# Patient Record
Sex: FEMALE | Race: WHITE | NOT HISPANIC OR LATINO | URBAN - METROPOLITAN AREA
[De-identification: names, ages, dates, MRNs, and addresses within clinical notes are randomized per-mention and may not be internally consistent; named-entity substitution may affect disease eponyms.]

---

## 2022-07-25 ENCOUNTER — HOSPITAL ENCOUNTER (EMERGENCY)
Facility: HOSPITAL | Age: 47
Discharge: HOME | End: 2022-07-25
Attending: SPECIALIST
Payer: MEDICAID

## 2022-07-25 VITALS
RESPIRATION RATE: 18 BRPM | WEIGHT: 201 LBS | DIASTOLIC BLOOD PRESSURE: 76 MMHG | SYSTOLIC BLOOD PRESSURE: 112 MMHG | HEIGHT: 65 IN | TEMPERATURE: 98 F | OXYGEN SATURATION: 99 % | BODY MASS INDEX: 33.49 KG/M2 | HEART RATE: 80 BPM

## 2022-07-25 DIAGNOSIS — M79.605 LEFT LEG PAIN: Primary | ICD-10-CM

## 2022-07-25 PROBLEM — E66.9 OBESITY: Status: ACTIVE | Noted: 2022-07-25

## 2022-07-25 LAB
APPEARANCE UR: CLEAR
B-HCG SERPL QL: NEGATIVE
BACTERIA #/AREA URNS AUTO: ABNORMAL /HPF
BILIRUB UR QL STRIP.AUTO: NEGATIVE MG/DL
COLOR UR AUTO: YELLOW
GLUCOSE UR QL STRIP.AUTO: NEGATIVE MG/DL
KETONES UR QL STRIP.AUTO: NEGATIVE MG/DL
LEUKOCYTE ESTERASE UR QL STRIP.AUTO: NEGATIVE UNIT/L
NITRITE UR QL STRIP.AUTO: NEGATIVE
PH UR STRIP.AUTO: 7 [PH]
PROT UR QL STRIP.AUTO: NEGATIVE MG/DL
RBC #/AREA URNS AUTO: ABNORMAL /HPF
RBC UR QL AUTO: ABNORMAL UNIT/L
SP GR UR STRIP.AUTO: 1.02
SQUAMOUS #/AREA URNS AUTO: ABNORMAL /HPF
UROBILINOGEN UR STRIP-ACNC: 1 MG/DL
WBC #/AREA URNS AUTO: ABNORMAL /HPF

## 2022-07-25 PROCEDURE — 99284 EMERGENCY DEPT VISIT MOD MDM: CPT | Mod: 25

## 2022-07-25 PROCEDURE — 96372 THER/PROPH/DIAG INJ SC/IM: CPT | Performed by: NURSE PRACTITIONER

## 2022-07-25 PROCEDURE — 63600175 PHARM REV CODE 636 W HCPCS: Performed by: NURSE PRACTITIONER

## 2022-07-25 PROCEDURE — 81001 URINALYSIS AUTO W/SCOPE: CPT | Performed by: NURSE PRACTITIONER

## 2022-07-25 PROCEDURE — 81025 URINE PREGNANCY TEST: CPT | Performed by: NURSE PRACTITIONER

## 2022-07-25 RX ORDER — MEDROXYPROGESTERONE ACETATE 150 MG/ML
150 INJECTION, SUSPENSION INTRAMUSCULAR
COMMUNITY
Start: 2022-06-22

## 2022-07-25 RX ORDER — DEXAMETHASONE SODIUM PHOSPHATE 4 MG/ML
8 INJECTION, SOLUTION INTRA-ARTICULAR; INTRALESIONAL; INTRAMUSCULAR; INTRAVENOUS; SOFT TISSUE
Status: COMPLETED | OUTPATIENT
Start: 2022-07-25 | End: 2022-07-25

## 2022-07-25 RX ORDER — TIZANIDINE 4 MG/1
4 TABLET ORAL 3 TIMES DAILY PRN
Qty: 15 TABLET | Refills: 0 | Status: SHIPPED | OUTPATIENT
Start: 2022-07-25 | End: 2022-07-30

## 2022-07-25 RX ORDER — KETOROLAC TROMETHAMINE 30 MG/ML
30 INJECTION, SOLUTION INTRAMUSCULAR; INTRAVENOUS
Status: COMPLETED | OUTPATIENT
Start: 2022-07-25 | End: 2022-07-25

## 2022-07-25 RX ORDER — DICLOFENAC SODIUM 25 MG/1
25 TABLET, DELAYED RELEASE ORAL 3 TIMES DAILY PRN
Qty: 15 TABLET | Refills: 0 | Status: SHIPPED | OUTPATIENT
Start: 2022-07-25 | End: 2022-08-01

## 2022-07-25 RX ADMIN — KETOROLAC TROMETHAMINE 30 MG: 30 INJECTION, SOLUTION INTRAMUSCULAR; INTRAVENOUS at 08:07

## 2022-07-25 RX ADMIN — DEXAMETHASONE SODIUM PHOSPHATE 8 MG: 4 INJECTION INTRA-ARTICULAR; INTRALESIONAL; INTRAMUSCULAR; INTRAVENOUS; SOFT TISSUE at 08:07

## 2022-07-25 NOTE — Clinical Note
"Sugar Weldon" Beverley was seen and treated in our emergency department on 7/25/2022.  She may return to work on 07/28/2022.       If you have any questions or concerns, please don't hesitate to call.      LAMAR Granados"

## 2022-07-26 NOTE — DISCHARGE INSTRUCTIONS
Avoid excessive bending, stooping or stair climbing  Diclofenac as needed for pain, take with food  Tizanidine as needed for muscle pain, do not take and drive

## 2022-07-26 NOTE — ED PROVIDER NOTES
Encounter Date: 7/25/2022       History     Chief Complaint   Patient presents with    Leg Pain     Complains of groin pain radiating into left leg onset friday     46 year old female presents with left inguinal pain that radiates into left thigh since Friday with no known injury. She denies fever, dysuria, NVDC. She states pain consistent whether she is standing, sitting or lying. She does reports some improvement with getting up and moving around. She reports history of fibroids. She is on Dep Provera.     The history is provided by the patient. No  was used.     Review of patient's allergies indicates:  No Known Allergies  No past medical history on file.  No past surgical history on file.  No family history on file.     Review of Systems   Constitutional: Negative for chills and fever.   Gastrointestinal: Negative for diarrhea, nausea and vomiting.   Genitourinary: Negative for dysuria and vaginal discharge.   Musculoskeletal: Positive for arthralgias.   Skin: Negative for rash and wound.   Neurological: Negative for numbness.       Physical Exam     Initial Vitals [07/25/22 1942]   BP Pulse Resp Temp SpO2   119/79 86 16 98.1 °F (36.7 °C) 98 %      MAP       --         Physical Exam    Constitutional: She appears well-developed and well-nourished.   HENT:   Head: Normocephalic.   Eyes: EOM are normal.   Cardiovascular: Normal rate.   Pulmonary/Chest: No respiratory distress.   Abdominal: There is no abdominal tenderness. Hernia confirmed negative in the left inguinal area.   Musculoskeletal:        Legs:       Comments: No erythema, rash, lymphadenopathy. TTP. Normal ROM     Lymphadenopathy: No inguinal adenopathy noted on the left side.   Skin: Skin is warm, dry and intact. No lesion and no abscess noted. No erythema.         ED Course   Procedures  Labs Reviewed   URINALYSIS, REFLEX TO URINE CULTURE - Abnormal; Notable for the following components:       Result Value    Blood, UA  "Trace-Intact (*)     All other components within normal limits   URINALYSIS, MICROSCOPIC - Abnormal; Notable for the following components:    Squamous Epithelial Cells, UA Few (*)     All other components within normal limits   PREGNANCY TEST, URINE RAPID - Normal          Imaging Results    None          Medications   dexamethasone injection 8 mg (has no administration in time range)   ketorolac injection 30 mg (has no administration in time range)     Medical Decision Making:   Differential Diagnosis:   Pelvic pain, inguinal hernia, radiculopathy  Clinical Tests:   Lab Tests: Ordered and Reviewed  The following lab test(s) were unremarkable: UPT and Urinalysis       <> Summary of Lab: UPT negative, urine without infection  ED Management:  Patient with negative urine and UPT. She denies injury, fever, NVDC. Pain improves with "moving around". She has history of fibroids. Patient given decadron and ketorolac in ER. Will discharge home with diclofenac and zanaflex. Work excuse for 2 days.                       Clinical Impression:   Final diagnoses:  [M79.605] Left leg pain (Primary)          ED Disposition Condition    Discharge Stable        ED Prescriptions     Medication Sig Dispense Start Date End Date Auth. Provider    diclofenac (VOLTAREN) 25 MG TbEC Take 1 tablet (25 mg total) by mouth 3 (three) times daily as needed (pain). 15 tablet 7/25/2022 8/1/2022 LAMAR Granados    tiZANidine (ZANAFLEX) 4 MG tablet Take 1 tablet (4 mg total) by mouth 3 (three) times daily as needed (muscle spasms). 15 tablet 7/25/2022 7/30/2022 LAMAR Granados        Follow-up Information    None          LAMAR Granados  07/25/22 2013    "

## 2022-09-18 ENCOUNTER — HOSPITAL ENCOUNTER (EMERGENCY)
Facility: HOSPITAL | Age: 47
Discharge: LEFT BEFORE DISCHARGE | End: 2022-09-18
Payer: MEDICAID

## 2022-09-18 ENCOUNTER — HOSPITAL ENCOUNTER (EMERGENCY)
Facility: HOSPITAL | Age: 47
Discharge: HOME | End: 2022-09-18
Attending: FAMILY MEDICINE
Payer: MEDICAID

## 2022-09-18 VITALS
RESPIRATION RATE: 18 BRPM | SYSTOLIC BLOOD PRESSURE: 122 MMHG | DIASTOLIC BLOOD PRESSURE: 78 MMHG | HEART RATE: 91 BPM | HEIGHT: 65 IN | BODY MASS INDEX: 32.65 KG/M2 | OXYGEN SATURATION: 99 % | WEIGHT: 196 LBS | TEMPERATURE: 98 F

## 2022-09-18 VITALS
DIASTOLIC BLOOD PRESSURE: 77 MMHG | WEIGHT: 192 LBS | SYSTOLIC BLOOD PRESSURE: 109 MMHG | RESPIRATION RATE: 22 BRPM | BODY MASS INDEX: 31.99 KG/M2 | HEART RATE: 86 BPM | HEIGHT: 65 IN | TEMPERATURE: 98 F | OXYGEN SATURATION: 100 %

## 2022-09-18 DIAGNOSIS — D21.9 FIBROIDS: Primary | ICD-10-CM

## 2022-09-18 LAB
ALBUMIN SERPL-MCNC: 3.7 GM/DL (ref 3.5–5)
ALBUMIN/GLOB SERPL: 1 RATIO (ref 1.1–2)
ALP SERPL-CCNC: 74 UNIT/L (ref 40–150)
ALT SERPL-CCNC: 9 UNIT/L (ref 0–55)
APPEARANCE UR: ABNORMAL
APPEARANCE UR: CLEAR
AST SERPL-CCNC: 9 UNIT/L (ref 5–34)
B-HCG SERPL QL: NEGATIVE
BACTERIA #/AREA URNS AUTO: ABNORMAL /HPF
BACTERIA #/AREA URNS AUTO: ABNORMAL /HPF
BASOPHILS # BLD AUTO: 0.01 X10(3)/MCL (ref 0–0.2)
BASOPHILS # BLD AUTO: 0.04 X10(3)/MCL (ref 0–0.2)
BASOPHILS NFR BLD AUTO: 0.1 %
BASOPHILS NFR BLD AUTO: 0.5 %
BILIRUB UR QL STRIP.AUTO: ABNORMAL MG/DL
BILIRUB UR QL STRIP.AUTO: NEGATIVE MG/DL
BILIRUBIN DIRECT+TOT PNL SERPL-MCNC: 0.7 MG/DL
BUN SERPL-MCNC: 9.6 MG/DL (ref 7–18.7)
CALCIUM SERPL-MCNC: 9.6 MG/DL (ref 8.4–10.2)
CHLORIDE SERPL-SCNC: 105 MMOL/L (ref 98–107)
CO2 SERPL-SCNC: 23 MMOL/L (ref 22–29)
COLOR UR AUTO: ABNORMAL
COLOR UR AUTO: YELLOW
CREAT SERPL-MCNC: 0.76 MG/DL (ref 0.55–1.02)
EOSINOPHIL # BLD AUTO: 0.13 X10(3)/MCL (ref 0–0.9)
EOSINOPHIL # BLD AUTO: 0.17 X10(3)/MCL (ref 0–0.9)
EOSINOPHIL NFR BLD AUTO: 1.2 %
EOSINOPHIL NFR BLD AUTO: 1.9 %
ERYTHROCYTE [DISTWIDTH] IN BLOOD BY AUTOMATED COUNT: 14.5 % (ref 11.5–17)
ERYTHROCYTE [DISTWIDTH] IN BLOOD BY AUTOMATED COUNT: 14.9 % (ref 11.5–17)
GFR SERPLBLD CREATININE-BSD FMLA CKD-EPI: >60 MLS/MIN/1.73/M2
GLOBULIN SER-MCNC: 3.8 GM/DL (ref 2.4–3.5)
GLUCOSE SERPL-MCNC: 114 MG/DL (ref 74–100)
GLUCOSE UR QL STRIP.AUTO: NEGATIVE MG/DL
GLUCOSE UR QL STRIP.AUTO: NEGATIVE MG/DL
HCT VFR BLD AUTO: 42 % (ref 37–47)
HCT VFR BLD AUTO: 45.4 % (ref 37–47)
HGB BLD-MCNC: 13 GM/DL (ref 12–16)
HGB BLD-MCNC: 14.1 GM/DL (ref 12–16)
IMM GRANULOCYTES # BLD AUTO: 0.02 X10(3)/MCL (ref 0–0.04)
IMM GRANULOCYTES # BLD AUTO: 0.02 X10(3)/MCL (ref 0–0.04)
IMM GRANULOCYTES NFR BLD AUTO: 0.2 %
IMM GRANULOCYTES NFR BLD AUTO: 0.2 %
KETONES UR QL STRIP.AUTO: NEGATIVE MG/DL
KETONES UR QL STRIP.AUTO: NEGATIVE MG/DL
LEUKOCYTE ESTERASE UR QL STRIP.AUTO: ABNORMAL UNIT/L
LEUKOCYTE ESTERASE UR QL STRIP.AUTO: NEGATIVE UNIT/L
LIPASE SERPL-CCNC: 26 U/L
LYMPHOCYTES # BLD AUTO: 3.55 X10(3)/MCL (ref 0.6–4.6)
LYMPHOCYTES # BLD AUTO: 3.56 X10(3)/MCL (ref 0.6–4.6)
LYMPHOCYTES NFR BLD AUTO: 34 %
LYMPHOCYTES NFR BLD AUTO: 40.5 %
MCH RBC QN AUTO: 24.5 PG (ref 27–31)
MCH RBC QN AUTO: 25.2 PG (ref 27–31)
MCHC RBC AUTO-ENTMCNC: 31 MG/DL (ref 33–36)
MCHC RBC AUTO-ENTMCNC: 31.1 MG/DL (ref 33–36)
MCV RBC AUTO: 79.1 FL (ref 80–94)
MCV RBC AUTO: 81.1 FL (ref 80–94)
MONOCYTES # BLD AUTO: 0.73 X10(3)/MCL (ref 0.1–1.3)
MONOCYTES # BLD AUTO: 0.77 X10(3)/MCL (ref 0.1–1.3)
MONOCYTES NFR BLD AUTO: 7 %
MONOCYTES NFR BLD AUTO: 8.8 %
NEUTROPHILS # BLD AUTO: 4.2 X10(3)/MCL (ref 2.1–9.2)
NEUTROPHILS # BLD AUTO: 6 X10(3)/MCL (ref 2.1–9.2)
NEUTROPHILS NFR BLD AUTO: 48.1 %
NEUTROPHILS NFR BLD AUTO: 57.5 %
NITRITE UR QL STRIP.AUTO: NEGATIVE
NITRITE UR QL STRIP.AUTO: NEGATIVE
NRBC BLD AUTO-RTO: 0 %
PH UR STRIP.AUTO: 5.5 [PH]
PH UR STRIP.AUTO: 5.5 [PH]
PLATELET # BLD AUTO: 252 X10(3)/MCL (ref 130–400)
PLATELET # BLD AUTO: 253 X10(3)/MCL (ref 130–400)
PMV BLD AUTO: 11.4 FL (ref 7.4–10.4)
PMV BLD AUTO: 11.4 FL (ref 7.4–10.4)
POTASSIUM SERPL-SCNC: 4 MMOL/L (ref 3.5–5.1)
PROT SERPL-MCNC: 7.5 GM/DL (ref 6.4–8.3)
PROT UR QL STRIP.AUTO: ABNORMAL MG/DL
PROT UR QL STRIP.AUTO: NEGATIVE MG/DL
RBC # BLD AUTO: 5.31 X10(6)/MCL (ref 4.2–5.4)
RBC # BLD AUTO: 5.6 X10(6)/MCL (ref 4.2–5.4)
RBC #/AREA URNS AUTO: ABNORMAL /HPF
RBC #/AREA URNS AUTO: ABNORMAL /HPF
RBC UR QL AUTO: ABNORMAL UNIT/L
RBC UR QL AUTO: ABNORMAL UNIT/L
SODIUM SERPL-SCNC: 139 MMOL/L (ref 136–145)
SP GR UR STRIP.AUTO: 1.02
SP GR UR STRIP.AUTO: 1.02 (ref 1–1.03)
SQUAMOUS #/AREA URNS AUTO: ABNORMAL /HPF
SQUAMOUS #/AREA URNS AUTO: ABNORMAL /HPF
UROBILINOGEN UR STRIP-ACNC: 0.2 MG/DL
UROBILINOGEN UR STRIP-ACNC: 1 MG/DL
WBC # SPEC AUTO: 10.5 X10(3)/MCL (ref 4.5–11.5)
WBC # SPEC AUTO: 8.8 X10(3)/MCL (ref 4.5–11.5)
WBC #/AREA URNS AUTO: ABNORMAL /HPF
WBC #/AREA URNS AUTO: ABNORMAL /HPF

## 2022-09-18 PROCEDURE — 25000003 PHARM REV CODE 250: Performed by: FAMILY MEDICINE

## 2022-09-18 PROCEDURE — 81025 URINE PREGNANCY TEST: CPT | Performed by: FAMILY MEDICINE

## 2022-09-18 PROCEDURE — 96361 HYDRATE IV INFUSION ADD-ON: CPT

## 2022-09-18 PROCEDURE — 96375 TX/PRO/DX INJ NEW DRUG ADDON: CPT

## 2022-09-18 PROCEDURE — 81001 URINALYSIS AUTO W/SCOPE: CPT | Performed by: NURSE PRACTITIONER

## 2022-09-18 PROCEDURE — 85025 COMPLETE CBC W/AUTO DIFF WBC: CPT | Performed by: FAMILY MEDICINE

## 2022-09-18 PROCEDURE — 25000003 PHARM REV CODE 250: Performed by: NURSE PRACTITIONER

## 2022-09-18 PROCEDURE — 99285 EMERGENCY DEPT VISIT HI MDM: CPT | Mod: 25

## 2022-09-18 PROCEDURE — 63600175 PHARM REV CODE 636 W HCPCS: Performed by: FAMILY MEDICINE

## 2022-09-18 PROCEDURE — 96374 THER/PROPH/DIAG INJ IV PUSH: CPT | Mod: 59

## 2022-09-18 PROCEDURE — 81001 URINALYSIS AUTO W/SCOPE: CPT | Performed by: FAMILY MEDICINE

## 2022-09-18 PROCEDURE — 80053 COMPREHEN METABOLIC PANEL: CPT | Performed by: FAMILY MEDICINE

## 2022-09-18 PROCEDURE — 83690 ASSAY OF LIPASE: CPT | Performed by: FAMILY MEDICINE

## 2022-09-18 PROCEDURE — 36415 COLL VENOUS BLD VENIPUNCTURE: CPT | Performed by: NURSE PRACTITIONER

## 2022-09-18 PROCEDURE — 36415 COLL VENOUS BLD VENIPUNCTURE: CPT | Performed by: FAMILY MEDICINE

## 2022-09-18 PROCEDURE — 85025 COMPLETE CBC W/AUTO DIFF WBC: CPT | Performed by: NURSE PRACTITIONER

## 2022-09-18 PROCEDURE — 99900041 HC LEFT WITHOUT BEING SEEN- EMERGENCY

## 2022-09-18 PROCEDURE — 25500020 PHARM REV CODE 255: Performed by: FAMILY MEDICINE

## 2022-09-18 RX ORDER — KETOROLAC TROMETHAMINE 30 MG/ML
15 INJECTION, SOLUTION INTRAMUSCULAR; INTRAVENOUS
Status: COMPLETED | OUTPATIENT
Start: 2022-09-18 | End: 2022-09-18

## 2022-09-18 RX ORDER — DICLOFENAC SODIUM 50 MG/1
50 TABLET, DELAYED RELEASE ORAL 3 TIMES DAILY
Qty: 9 TABLET | Refills: 0 | Status: SHIPPED | OUTPATIENT
Start: 2022-09-18 | End: 2022-09-21

## 2022-09-18 RX ORDER — KETOROLAC TROMETHAMINE 10 MG/1
10 TABLET, FILM COATED ORAL
Status: COMPLETED | OUTPATIENT
Start: 2022-09-18 | End: 2022-09-18

## 2022-09-18 RX ADMIN — SODIUM CHLORIDE 500 ML: 9 INJECTION, SOLUTION INTRAVENOUS at 07:09

## 2022-09-18 RX ADMIN — KETOROLAC TROMETHAMINE 15 MG: 30 INJECTION, SOLUTION INTRAMUSCULAR at 09:09

## 2022-09-18 RX ADMIN — KETOROLAC TROMETHAMINE 15 MG: 30 INJECTION, SOLUTION INTRAMUSCULAR; INTRAVENOUS at 07:09

## 2022-09-18 RX ADMIN — KETOROLAC TROMETHAMINE 10 MG: 10 TABLET, FILM COATED ORAL at 12:09

## 2022-09-18 RX ADMIN — IOPAMIDOL 100 ML: 755 INJECTION, SOLUTION INTRAVENOUS at 09:09

## 2022-09-18 NOTE — FIRST PROVIDER EVALUATION
"Medical screening examination initiated.  I have conducted a focused provider triage encounter, findings are as follows:    Brief history of present illness:  46y/o F presents to the ED with lower abdominal pain describes as " contractions" Onset 2 days.  Denies any n/v    There were no vitals filed for this visit.    Pertinent physical exam:  AAA x 3    Brief workup plan:  Labs,     Preliminary workup initiated; this workup will be continued and followed by the physician or advanced practice provider that is assigned to the patient when roomed.  "

## 2022-09-18 NOTE — Clinical Note
"Sugar Weldon" Beverley was seen and treated in our emergency department on 9/18/2022.  She may return to work on 09/21/2022.       If you have any questions or concerns, please don't hesitate to call.      th RN    "

## 2022-09-19 NOTE — ED PROVIDER NOTES
Encounter Date: 9/18/2022       History     Chief Complaint   Patient presents with    Abdominal Pain     Reports bilat lower abd pain/lower back pain x2 days. Hx of fibroids. Denies fever, chills, dysuria, vaginal bleeding, or n/v/d. On depo shot.      Patient with bilateral lower abdominal pain was seen at [facility] but did not wait for the results and left otherwise patient does have a history of fibroids no dysuria no vaginal bleeding patient does get the Depo shot.      Review of patient's allergies indicates:  No Known Allergies  No past medical history on file.  No past surgical history on file.  No family history on file.     Review of Systems   Constitutional:  Negative for fever.   HENT:  Negative for sore throat.    Respiratory:  Negative for shortness of breath.    Cardiovascular:  Negative for chest pain.   Gastrointestinal:  Positive for abdominal pain. Negative for nausea.   Genitourinary:  Negative for dysuria.   Musculoskeletal:  Negative for back pain.   Skin:  Negative for rash.   Neurological:  Negative for weakness.   Hematological:  Does not bruise/bleed easily.   All other systems reviewed and are negative.    Physical Exam     Initial Vitals [09/18/22 1924]   BP Pulse Resp Temp SpO2   (!) 169/85 108 20 98.2 °F (36.8 °C) 100 %      MAP       --         Physical Exam    Nursing note and vitals reviewed.  Constitutional: She appears well-developed.   HENT:   Head: Normocephalic and atraumatic.   Right Ear: External ear normal.   Left Ear: External ear normal.   Nose: Nose normal.   Mouth/Throat: Oropharynx is clear and moist. No oropharyngeal exudate.   Eyes: Conjunctivae and EOM are normal. Pupils are equal, round, and reactive to light. Right eye exhibits no discharge. Left eye exhibits no discharge.   Neck: Neck supple. No tracheal deviation present. No JVD present.   Normal range of motion.  Cardiovascular:  Normal rate, regular rhythm, normal heart sounds and intact distal pulses.      Exam reveals no gallop and no friction rub.       No murmur heard.  Pulmonary/Chest: Breath sounds normal. No stridor. No respiratory distress. She has no wheezes. She has no rhonchi. She has no rales.   Abdominal: Abdomen is soft. Bowel sounds are normal. She exhibits no distension and no mass. There is abdominal tenderness. There is no rebound and no guarding.   Musculoskeletal:         General: Normal range of motion.      Cervical back: Normal range of motion and neck supple.     Neurological: She is alert and oriented to person, place, and time. She has normal strength. No cranial nerve deficit.   Skin: Skin is warm and dry. No rash and no abscess noted. No erythema.   Psychiatric: She has a normal mood and affect. Her behavior is normal. Judgment and thought content normal.       ED Course   Procedures  Labs Reviewed   COMPREHENSIVE METABOLIC PANEL - Abnormal; Notable for the following components:       Result Value    Glucose Level 114 (*)     Globulin 3.8 (*)     Albumin/Globulin Ratio 1.0 (*)     All other components within normal limits   URINALYSIS, REFLEX TO URINE CULTURE - Abnormal; Notable for the following components:    Color, UA Dark Yellow (*)     Appearance, UA Slightly Cloudy (*)     Protein, UA Trace (*)     Blood, UA Moderate (*)     Bilirubin, UA Small (*)     All other components within normal limits   CBC WITH DIFFERENTIAL - Abnormal; Notable for the following components:    MCV 79.1 (*)     MCH 24.5 (*)     MCHC 31.0 (*)     MPV 11.4 (*)     All other components within normal limits   URINALYSIS, MICROSCOPIC - Abnormal; Notable for the following components:    RBC, UA 11-20 (*)     Squamous Epithelial Cells, UA Few (*)     All other components within normal limits   LIPASE - Normal   HCG QUALITATIVE URINE - Normal   CBC W/ AUTO DIFFERENTIAL    Narrative:     The following orders were created for panel order CBC W/ AUTO DIFFERENTIAL.  Procedure                               Abnormality          Status                     ---------                               -----------         ------                     CBC with Differential[[id number]]        Abnormal            Final result                 Please view results for these tests on the individual orders.          Imaging Results              CT Abdomen Pelvis With Contrast (Preliminary result)  Result time 09/18/22 21:51:03      Preliminary result by Celestine Rascon MD (09/18/22 21:51:03)                   Narrative:    START OF REPORT:  Technique: CT of the abdomen and pelvis was performed with axial images as well as sagittal and coronal reconstruction images with intravenous contrast but without oral contrast.    Comparison: None available.    Clinical History: Reports bilat lower abd pain/lower back pain x2 days. Hx of fibroids. Denies fever, chills, dysuria, vaginal bleeding, or n/v/d. On depo shot.    Dosage Information: Automated Exposure Control was utilized 1213.24 mGy.cm.    Findings:  Lines and Tubes: None.  Thorax:  Lungs: The visualized lung bases appear unremarkable.  Pleura: No pleural effusion is seen.  Heart: The heart size is within normal limits.  Abdomen:  Abdominal Wall: No abdominal wall pathology is seen.  Liver: A 5 mm cyst is seen in the left lobe of the liver (series 3 image 16). The liver otherwise appears unremarkable.  Biliary System: No extrahepatic biliary duct dilatation is seen.  Gallbladder: The gallbladder is non-distended and appears otherwise unremarkable.  Pancreas: The pancreas appears unremarkable.  Spleen: The spleen appears unremarkable.  Adrenals: The adrenal glands appear unremarkable.  Kidneys: The kidneys appear unremarkable with no stones cysts masses or hydronephrosis.  Aorta: There is mild calcification of the abdominal aorta and its branches.  IVC: Unremarkable.  Bowel:  Esophagus: The visualized esophagus appears unremarkable.  Stomach: The stomach appears unremarkable.  Duodenum: Unremarkable  appearing duodenum.  Small Bowel: The small bowel appears unremarkable.  Colon: Nondistended.  Appendix: The appendix appears unremarkable.  Peritoneum: Trace free fluid is seen in the pelvis. No free intraperitoneal gas is seen.    Pelvis:  Bladder: The bladder is nondistended and cannot be definitively evaluated.  Female:  Uterus: The uterus is enlarged and demonstrates lobulated contour with multiple fibroids. The largest fibroid measures 7.2 cm in diameter.  Ovaries: The ovaries are not identified. No adnexal masses are seen.    Bony structures:  Dorsal Spine: Degenerative changes are seen at L5-S1.  Bony Pelvis: The visualized bony structures of the pelvis appear unremarkable.      Impression:  1. Trace free fluid is seen in the pelvis.  2. The uterus is enlarged and demonstrates lobulated contour with multiple fibroids. The largest fibroid measures 7.2 cm in diameter. Correlate clinically as regards to additional evaluation and follow up.  3. No acute intraabdominal or pelvic solid organ or bowel pathology identified. Details and other findings as discussed above.                          Preliminary result by [employer], Rad Results In (09/18/22 21:51:03)                   Narrative:    START OF REPORT:  Technique: CT of the abdomen and pelvis was performed with axial images as well as sagittal and coronal reconstruction images with intravenous contrast but without oral contrast.    Comparison: None available.    Clinical History: Reports bilat lower abd pain/lower back pain x2 days. Hx of fibroids. Denies fever, chills, dysuria, vaginal bleeding, or n/v/d. On depo shot.    Dosage Information: Automated Exposure Control was utilized 1213.24 mGy.cm.    Findings:  Lines and Tubes: None.  Thorax:  Lungs: The visualized lung bases appear unremarkable.  Pleura: No pleural effusion is seen.  Heart: The heart size is within normal limits.  Abdomen:  Abdominal Wall: No abdominal wall pathology is seen.  Liver: A 5 mm  cyst is seen in the left lobe of the liver (series 3 image 16). The liver otherwise appears unremarkable.  Biliary System: No extrahepatic biliary duct dilatation is seen.  Gallbladder: The gallbladder is non-distended and appears otherwise unremarkable.  Pancreas: The pancreas appears unremarkable.  Spleen: The spleen appears unremarkable.  Adrenals: The adrenal glands appear unremarkable.  Kidneys: The kidneys appear unremarkable with no stones cysts masses or hydronephrosis.  Aorta: There is mild calcification of the abdominal aorta and its branches.  IVC: Unremarkable.  Bowel:  Esophagus: The visualized esophagus appears unremarkable.  Stomach: The stomach appears unremarkable.  Duodenum: Unremarkable appearing duodenum.  Small Bowel: The small bowel appears unremarkable.  Colon: Nondistended.  Appendix: The appendix appears unremarkable.  Peritoneum: Trace free fluid is seen in the pelvis. No free intraperitoneal gas is seen.    Pelvis:  Bladder: The bladder is nondistended and cannot be definitively evaluated.  Female:  Uterus: The uterus is enlarged and demonstrates lobulated contour with multiple fibroids. The largest fibroid measures 7.2 cm in diameter.  Ovaries: The ovaries are not identified. No adnexal masses are seen.    Bony structures:  Dorsal Spine: Degenerative changes are seen at L5-S1.  Bony Pelvis: The visualized bony structures of the pelvis appear unremarkable.      Impression:  1. Trace free fluid is seen in the pelvis.  2. The uterus is enlarged and demonstrates lobulated contour with multiple fibroids. The largest fibroid measures 7.2 cm in diameter. Correlate clinically as regards to additional evaluation and follow up.  3. No acute intraabdominal or pelvic solid organ or bowel pathology identified. Details and other findings as discussed above.                                         Medications   sodium chloride 0.9% bolus 500 mL (0 mLs Intravenous Stopped 9/18/22 2053)   ketorolac  injection 15 mg (15 mg Intravenous Given 9/18/22 1952)   ketorolac injection 15 mg (15 mg Intravenous Given 9/18/22 2121)   iopamidoL (ISOVUE-370) injection 100 mL (100 mLs Intravenous Given 9/18/22 2151)                              Clinical Impression:   Final diagnoses:  [D21.9] Fibroids (Primary)      ED Disposition Condition    Discharge Stable          ED Prescriptions       Medication Sig Dispense Start Date End Date Auth. Provider    diclofenac (VOLTAREN) 50 MG EC tablet Take 1 tablet (50 mg total) by mouth 3 (three) times daily. for 3 days 9 tablet 9/18/2022 9/21/2022 Jonathon Tabor MD          Follow-up Information    None          Jonathon Tabor MD  09/18/22 224       Jonathon Tabor MD  09/18/22 9744

## 2024-02-17 ENCOUNTER — HOSPITAL ENCOUNTER (EMERGENCY)
Facility: HOSPITAL | Age: 49
Discharge: HOME | End: 2024-02-17
Attending: EMERGENCY MEDICINE

## 2024-02-17 VITALS
RESPIRATION RATE: 13 BRPM | WEIGHT: 195 LBS | BODY MASS INDEX: 31.34 KG/M2 | TEMPERATURE: 98 F | HEART RATE: 67 BPM | HEIGHT: 66 IN | OXYGEN SATURATION: 100 % | DIASTOLIC BLOOD PRESSURE: 74 MMHG | SYSTOLIC BLOOD PRESSURE: 120 MMHG

## 2024-02-17 DIAGNOSIS — R10.2 FEMALE PELVIC PAIN: Primary | ICD-10-CM

## 2024-02-17 DIAGNOSIS — U07.1 COVID-19: ICD-10-CM

## 2024-02-17 DIAGNOSIS — R42 LIGHTHEADEDNESS: ICD-10-CM

## 2024-02-17 LAB
ALBUMIN SERPL-MCNC: 3.9 G/DL (ref 3.5–5)
ALBUMIN/GLOB SERPL: 1 RATIO (ref 1.1–2)
ALP SERPL-CCNC: 63 UNIT/L (ref 40–150)
ALT SERPL-CCNC: 13 UNIT/L (ref 0–55)
APPEARANCE UR: ABNORMAL
AST SERPL-CCNC: 18 UNIT/L (ref 5–34)
B-HCG SERPL QL: NEGATIVE
BACTERIA #/AREA URNS AUTO: ABNORMAL /HPF
BASOPHILS # BLD AUTO: 0.02 X10(3)/MCL
BASOPHILS NFR BLD AUTO: 0.5 %
BILIRUB SERPL-MCNC: 0.4 MG/DL
BILIRUB UR QL STRIP.AUTO: NEGATIVE
BUN SERPL-MCNC: 7.9 MG/DL (ref 7–18.7)
CALCIUM SERPL-MCNC: 8.9 MG/DL (ref 8.4–10.2)
CHLORIDE SERPL-SCNC: 109 MMOL/L (ref 98–107)
CO2 SERPL-SCNC: 23 MMOL/L (ref 22–29)
COLOR UR AUTO: YELLOW
CREAT SERPL-MCNC: 0.83 MG/DL (ref 0.55–1.02)
EOSINOPHIL # BLD AUTO: 0.04 X10(3)/MCL (ref 0–0.9)
EOSINOPHIL NFR BLD AUTO: 1 %
ERYTHROCYTE [DISTWIDTH] IN BLOOD BY AUTOMATED COUNT: 14 % (ref 11.5–17)
FLUAV AG UPPER RESP QL IA.RAPID: NOT DETECTED
FLUBV AG UPPER RESP QL IA.RAPID: NOT DETECTED
GFR SERPLBLD CREATININE-BSD FMLA CKD-EPI: >60 MLS/MIN/1.73/M2
GLOBULIN SER-MCNC: 3.8 GM/DL (ref 2.4–3.5)
GLUCOSE SERPL-MCNC: 89 MG/DL (ref 74–100)
GLUCOSE UR QL STRIP.AUTO: NORMAL
HCT VFR BLD AUTO: 43.4 % (ref 37–47)
HGB BLD-MCNC: 13.7 G/DL (ref 12–16)
HYALINE CASTS #/AREA URNS LPF: ABNORMAL /LPF
IMM GRANULOCYTES # BLD AUTO: 0.01 X10(3)/MCL (ref 0–0.04)
IMM GRANULOCYTES NFR BLD AUTO: 0.2 %
KETONES UR QL STRIP.AUTO: NEGATIVE
LEUKOCYTE ESTERASE UR QL STRIP.AUTO: NEGATIVE
LYMPHOCYTES # BLD AUTO: 2.15 X10(3)/MCL (ref 0.6–4.6)
LYMPHOCYTES NFR BLD AUTO: 53.6 %
MCH RBC QN AUTO: 25.4 PG (ref 27–31)
MCHC RBC AUTO-ENTMCNC: 31.6 G/DL (ref 33–36)
MCV RBC AUTO: 80.5 FL (ref 80–94)
MONOCYTES # BLD AUTO: 0.63 X10(3)/MCL (ref 0.1–1.3)
MONOCYTES NFR BLD AUTO: 15.7 %
MUCOUS THREADS URNS QL MICRO: ABNORMAL /LPF
NEUTROPHILS # BLD AUTO: 1.16 X10(3)/MCL (ref 2.1–9.2)
NEUTROPHILS NFR BLD AUTO: 29 %
NITRITE UR QL STRIP.AUTO: NEGATIVE
NRBC BLD AUTO-RTO: 0 %
PH UR STRIP.AUTO: 5.5 [PH]
PLATELET # BLD AUTO: 171 X10(3)/MCL (ref 130–400)
PMV BLD AUTO: 11.5 FL (ref 7.4–10.4)
POTASSIUM SERPL-SCNC: 4.4 MMOL/L (ref 3.5–5.1)
PROT SERPL-MCNC: 7.7 GM/DL (ref 6.4–8.3)
PROT UR QL STRIP.AUTO: ABNORMAL
RBC # BLD AUTO: 5.39 X10(6)/MCL (ref 4.2–5.4)
RBC #/AREA URNS AUTO: ABNORMAL /HPF
RBC UR QL AUTO: ABNORMAL
SARS-COV-2 RNA RESP QL NAA+PROBE: DETECTED
SODIUM SERPL-SCNC: 137 MMOL/L (ref 136–145)
SP GR UR STRIP.AUTO: 1.02 (ref 1–1.03)
SQUAMOUS #/AREA URNS LPF: ABNORMAL /HPF
TROPONIN I SERPL-MCNC: <0.01 NG/ML (ref 0–0.04)
UROBILINOGEN UR STRIP-ACNC: NORMAL
WBC # SPEC AUTO: 4.01 X10(3)/MCL (ref 4.5–11.5)
WBC #/AREA URNS AUTO: ABNORMAL /HPF

## 2024-02-17 PROCEDURE — 84484 ASSAY OF TROPONIN QUANT: CPT | Performed by: NURSE PRACTITIONER

## 2024-02-17 PROCEDURE — 85025 COMPLETE CBC W/AUTO DIFF WBC: CPT | Performed by: NURSE PRACTITIONER

## 2024-02-17 PROCEDURE — 93005 ELECTROCARDIOGRAM TRACING: CPT

## 2024-02-17 PROCEDURE — 93010 ELECTROCARDIOGRAM REPORT: CPT | Mod: ,,, | Performed by: INTERNAL MEDICINE

## 2024-02-17 PROCEDURE — 63600175 PHARM REV CODE 636 W HCPCS: Performed by: EMERGENCY MEDICINE

## 2024-02-17 PROCEDURE — 81025 URINE PREGNANCY TEST: CPT | Performed by: NURSE PRACTITIONER

## 2024-02-17 PROCEDURE — 96374 THER/PROPH/DIAG INJ IV PUSH: CPT

## 2024-02-17 PROCEDURE — 99284 EMERGENCY DEPT VISIT MOD MDM: CPT | Mod: 25

## 2024-02-17 PROCEDURE — 81001 URINALYSIS AUTO W/SCOPE: CPT | Performed by: NURSE PRACTITIONER

## 2024-02-17 PROCEDURE — 80053 COMPREHEN METABOLIC PANEL: CPT | Performed by: NURSE PRACTITIONER

## 2024-02-17 PROCEDURE — 0240U COVID/FLU A&B PCR: CPT | Performed by: EMERGENCY MEDICINE

## 2024-02-17 RX ORDER — KETOROLAC TROMETHAMINE 30 MG/ML
30 INJECTION, SOLUTION INTRAMUSCULAR; INTRAVENOUS
Status: COMPLETED | OUTPATIENT
Start: 2024-02-17 | End: 2024-02-17

## 2024-02-17 RX ORDER — KETOROLAC TROMETHAMINE 10 MG/1
10 TABLET, FILM COATED ORAL EVERY 6 HOURS
Qty: 20 TABLET | Refills: 0 | Status: SHIPPED | OUTPATIENT
Start: 2024-02-17 | End: 2024-02-22

## 2024-02-17 RX ORDER — TRAMADOL HYDROCHLORIDE 50 MG/1
50 TABLET ORAL EVERY 6 HOURS PRN
Qty: 20 TABLET | Refills: 0 | Status: SHIPPED | OUTPATIENT
Start: 2024-02-17 | End: 2024-02-22

## 2024-02-17 RX ADMIN — KETOROLAC TROMETHAMINE 30 MG: 30 INJECTION, SOLUTION INTRAMUSCULAR; INTRAVENOUS at 12:02

## 2024-02-17 NOTE — FIRST PROVIDER EVALUATION
Medical screening examination initiated.  I have conducted a focused provider triage encounter, findings are as follows:    Brief history of present illness:  47y/o F presents to the ED with vomiting, chili's with lower abdominal pain that extends to the lower back. States feels lightheaded with SOB Onset three days.     There were no vitals filed for this visit.    Pertinent physical exam:  AAA x 3    Brief workup plan:  Labs     Preliminary workup initiated; this workup will be continued and followed by the physician or advanced practice provider that is assigned to the patient when roomed.

## 2024-02-17 NOTE — Clinical Note
"Debby Cody" Sofie was seen and treated in our emergency department on 2/17/2024.  She may return to work on 02/23/2024.       If you have any questions or concerns, please don't hesitate to call.      Polly Stephen RN    "

## 2024-02-17 NOTE — ED PROVIDER NOTES
Encounter Date: 2/17/2024    SCRIBE #1 NOTE: I, Chetan Navarro, am scribing for, and in the presence of,  Efrain Hubbard MD. I have scribed the following portions of the note - Other sections scribed: HPI, ROS, PE, MDM.       History     Chief Complaint   Patient presents with    Abdominal Pain     Bilateral lower abd pain and lower back pain x 3 days. Also reports 1 episode of vomiting. +dizziness.      A 49 y/o female with a history of fibroids presents to [facility] ED with lower abdominal pain and lower back pain for several weeks worsening over the past 3 days. Patient states that she has had associated symptoms of x1 episode of vomiting, constipation, and dizziness.   Patient reports that she has had recent contacts with coworkers who have COVID.     The history is provided by the patient and medical records.     Review of patient's allergies indicates:  No Known Allergies  Past Medical History:   Diagnosis Date    Fibroids      History reviewed. No pertinent surgical history.  History reviewed. No pertinent family history.  Social History     Tobacco Use    Smoking status: Every Day     Types: Cigarettes    Smokeless tobacco: Never     Review of Systems   Constitutional:  Negative for fatigue, fever and unexpected weight change.   HENT:  Negative for congestion and rhinorrhea.    Eyes:  Negative for pain.   Respiratory:  Negative for chest tightness, shortness of breath and wheezing.    Cardiovascular:  Negative for chest pain.   Gastrointestinal:  Positive for abdominal pain and constipation. Negative for diarrhea, nausea and vomiting.   Genitourinary:  Negative for dysuria.   Musculoskeletal:  Positive for back pain. Negative for neck pain.   Skin:  Negative for rash.   Allergic/Immunologic: Negative for environmental allergies, food allergies and immunocompromised state.   Neurological:  Negative for dizziness and speech difficulty.   Hematological:  Does not bruise/bleed easily.    Psychiatric/Behavioral:  Negative for sleep disturbance and suicidal ideas.        Physical Exam     Initial Vitals [02/17/24 1120]   BP Pulse Resp Temp SpO2   113/75 85 19 98.4 °F (36.9 °C) 100 %      MAP       --         Physical Exam    Nursing note and vitals reviewed.  Constitutional: She appears well-developed and well-nourished.   HENT:   Head: Normocephalic and atraumatic.   Right Ear: External ear normal.   Left Ear: External ear normal.   Eyes: Conjunctivae and EOM are normal. Pupils are equal, round, and reactive to light.   Neck: Neck supple.   Normal range of motion.  Cardiovascular:  Normal rate, regular rhythm, normal heart sounds and intact distal pulses.           Pulmonary/Chest: Breath sounds normal.   Abdominal: Abdomen is soft. Bowel sounds are normal. There is abdominal tenderness (mild) in the suprapubic area. There is no rebound and no guarding.   Musculoskeletal:         General: Normal range of motion.      Cervical back: Normal range of motion and neck supple.     Neurological: She is alert and oriented to person, place, and time. GCS score is 15. GCS eye subscore is 4. GCS verbal subscore is 5. GCS motor subscore is 6.   Skin: Skin is warm and dry. Capillary refill takes less than 2 seconds.   Psychiatric: She has a normal mood and affect. Her behavior is normal. Judgment and thought content normal.         ED Course   Procedures  Labs Reviewed   COMPREHENSIVE METABOLIC PANEL - Abnormal; Notable for the following components:       Result Value    Chloride 109 (*)     Globulin 3.8 (*)     Albumin/Globulin Ratio 1.0 (*)     All other components within normal limits   URINALYSIS, REFLEX TO URINE CULTURE - Abnormal; Notable for the following components:    Appearance, UA Turbid (*)     Protein, UA Trace (*)     Blood, UA 2+ (*)     Bacteria, UA Few (*)     Squamous Epithelial Cells, UA Occasional (*)     Mucous, UA Trace (*)     Hyaline Casts, UA 0-2 (*)     RBC, UA 6-10 (*)     All other  components within normal limits   CBC WITH DIFFERENTIAL - Abnormal; Notable for the following components:    WBC 4.01 (*)     MCH 25.4 (*)     MCHC 31.6 (*)     MPV 11.5 (*)     Neut # 1.16 (*)     All other components within normal limits   COVID/FLU A&B PCR - Abnormal; Notable for the following components:    SARS-CoV-2 PCR Detected (*)     All other components within normal limits    Narrative:     The Xpert Xpress SARS-CoV-2/FLU/RSV plus is a rapid, multiplexed real-time PCR test intended for the simultaneous qualitative detection and differentiation of SARS-CoV-2, Influenza A, Influenza B, and respiratory syncytial virus (RSV) viral RNA in either nasopharyngeal swab or nasal swab specimens.         TROPONIN I - Normal   PREGNANCY TEST, URINE RAPID - Normal   CBC W/ AUTO DIFFERENTIAL    Narrative:     The following orders were created for panel order CBC Auto Differential.  Procedure                               Abnormality         Status                     ---------                               -----------         ------                     CBC with Differential[[id number]]       Abnormal            Final result                 Please view results for these tests on the individual orders.     EKG Readings: (Independently Interpreted)   Initial Reading: No STEMI. Rhythm: Normal Sinus Rhythm. Heart Rate: 87. Ectopy: No Ectopy. ST Segments: Normal ST Segments. T Waves Flipped: II, III, AVF and V3. Axis: Normal. Clinical Impression: Normal Sinus Rhythm       Imaging Results    None          Medications   ketorolac injection 30 mg (30 mg Intravenous Given 2/17/24 1237)     Medical Decision Making  Differential diagnosis includes but is not limited to UTI, fibroids, chronic pelvic pain, constipation, COVID, and malingering.     Risk  Prescription drug management.            Scribe Attestation:   Scribe #1: I performed the above scribed service and the documentation accurately describes the services I performed. I  attest to the accuracy of the note.    Attending Attestation:           Physician Attestation for Scribe:  Physician Attestation Statement for Scribe #1: I, Efrain Hubbard MD, reviewed documentation, as scribed by Chetan Navarro in my presence, and it is both accurate and complete.                                    Clinical Impression:  Final diagnoses:  [R42] Lightheadedness  [R10.2] Female pelvic pain (Primary)  [U07.1] COVID-19          ED Disposition Condition    Discharge Stable          ED Prescriptions       Medication Sig Dispense Start Date End Date Auth. Provider    ketorolac (TORADOL) 10 mg tablet Take 1 tablet (10 mg total) by mouth every 6 (six) hours. for 5 days 20 tablet 2/17/2024 2/22/2024 Efrain Hubbard MD    traMADoL (ULTRAM) 50 mg tablet Take 1 tablet (50 mg total) by mouth every 6 (six) hours as needed for Pain. Final diagnoses: [R42] Lightheadedness [R10.2] Female pelvic pain (Primary) [U07.1] COVID-19 20 tablet 2/17/2024 2/22/2024 Efrain Hubbard MD          Follow-up Information       Follow up With Specialties Details Why Contact Info    Follow up with your primary care provider in 2 weeks if not improved                 Efrain Hubbard MD  02/17/24 [id number]       Efrain Hubbard MD  02/17/24 9805

## 2024-02-18 LAB
OHS QRS DURATION: 64 MS
OHS QTC CALCULATION: 430 MS

## 2025-01-23 ENCOUNTER — HOSPITAL ENCOUNTER (EMERGENCY)
Facility: HOSPITAL | Age: 50
Discharge: HOME | End: 2025-01-23
Attending: EMERGENCY MEDICINE

## 2025-01-23 VITALS
HEIGHT: 65 IN | OXYGEN SATURATION: 98 % | BODY MASS INDEX: 31.65 KG/M2 | SYSTOLIC BLOOD PRESSURE: 141 MMHG | DIASTOLIC BLOOD PRESSURE: 91 MMHG | HEART RATE: 110 BPM | RESPIRATION RATE: 18 BRPM | WEIGHT: 190 LBS | TEMPERATURE: 98 F

## 2025-01-23 DIAGNOSIS — M54.32 LEFT SIDED SCIATICA: Primary | ICD-10-CM

## 2025-01-23 PROCEDURE — 63600175 PHARM REV CODE 636 W HCPCS: Performed by: EMERGENCY MEDICINE

## 2025-01-23 PROCEDURE — 99284 EMERGENCY DEPT VISIT MOD MDM: CPT | Mod: 25

## 2025-01-23 PROCEDURE — 96372 THER/PROPH/DIAG INJ SC/IM: CPT | Performed by: EMERGENCY MEDICINE

## 2025-01-23 RX ORDER — GABAPENTIN 300 MG/1
300 CAPSULE ORAL 3 TIMES DAILY
Qty: 42 CAPSULE | Refills: 0 | Status: SHIPPED | OUTPATIENT
Start: 2025-01-23 | End: 2025-02-06

## 2025-01-23 RX ORDER — FENTANYL CITRATE 50 UG/ML
100 INJECTION, SOLUTION INTRAMUSCULAR; INTRAVENOUS
Status: COMPLETED | OUTPATIENT
Start: 2025-01-23 | End: 2025-01-23

## 2025-01-23 RX ORDER — HYDROCODONE BITARTRATE AND ACETAMINOPHEN 10; 325 MG/1; MG/1
1 TABLET ORAL EVERY 6 HOURS PRN
Qty: 20 TABLET | Refills: 0 | Status: SHIPPED | OUTPATIENT
Start: 2025-01-23 | End: 2025-01-28

## 2025-01-23 RX ORDER — KETOROLAC TROMETHAMINE 10 MG/1
10 TABLET, FILM COATED ORAL EVERY 6 HOURS
Qty: 20 TABLET | Refills: 0 | Status: SHIPPED | OUTPATIENT
Start: 2025-01-23 | End: 2025-01-28

## 2025-01-23 RX ORDER — CYCLOBENZAPRINE HCL 10 MG
10 TABLET ORAL 3 TIMES DAILY PRN
Qty: 15 TABLET | Refills: 0 | Status: SHIPPED | OUTPATIENT
Start: 2025-01-23 | End: 2025-01-28

## 2025-01-23 RX ADMIN — FENTANYL CITRATE 100 MCG: 50 INJECTION, SOLUTION INTRAMUSCULAR; INTRAVENOUS at 10:01

## 2025-01-23 NOTE — Clinical Note
"Sugar Huaa" Beverley was seen and treated in our emergency department on 1/23/2025.  She may return to work on 01/25/2025.       If you have any questions or concerns, please don't hesitate to call.      Linda FONSECA    "

## 2025-01-24 NOTE — ED PROVIDER NOTES
Encounter Date: 1/23/2025       History     Chief Complaint   Patient presents with    Sciatica     Pt c/o sciatica pain to left buttocks and radiates to Left lower leg that started Monday. Pt took BC powder 2 hrs PTA.      The history is provided by the patient.   Back Pain   This is a new problem. The current episode started several days ago. The problem occurs constantly. The problem has been gradually worsening. The pain is associated with no known injury. The pain is present in the lumbar spine and gluteal region. The quality of the pain is described as stabbing and burning. The pain radiates to the left leg, left thigh and left foot. The pain is The same all the time. Associated symptoms include leg pain. Pertinent negatives include no chest pain, no fever, no bowel incontinence, no perianal numbness, no bladder incontinence, no dysuria, no pelvic pain, no paresthesias, no paresis, no tingling and no weakness. She has tried NSAIDs for the symptoms. The treatment provided no relief. Risk factors include lack of exercise.   Thinks she has had similar symptoms in the past but isn't certain.    Review of patient's allergies indicates:  No Known Allergies  Past Medical History:   Diagnosis Date    Fibroids      History reviewed. No pertinent surgical history.  No family history on file.  Social History     Tobacco Use    Smoking status: Every Day     Types: Cigarettes    Smokeless tobacco: Never   Substance Use Topics    Alcohol use: Not Currently    Drug use: Never     Review of Systems   Constitutional:  Negative for fever.   HENT:  Negative for sore throat.    Respiratory:  Negative for shortness of breath.    Cardiovascular:  Negative for chest pain.   Gastrointestinal:  Negative for bowel incontinence and nausea.   Genitourinary:  Negative for bladder incontinence, dysuria and pelvic pain.   Musculoskeletal:  Positive for back pain.   Skin:  Negative for rash.   Neurological:  Negative for tingling, weakness  and paresthesias.   Hematological:  Does not bruise/bleed easily.       Physical Exam     Initial Vitals [01/23/25 2138]   BP Pulse Resp Temp SpO2   (!) 141/91 110 18 97.9 °F (36.6 °C) 98 %      MAP       --         Physical Exam    Nursing note and vitals reviewed.  Constitutional: She appears well-developed and well-nourished.   HENT:   Head: Normocephalic and atraumatic.   Right Ear: External ear normal.   Left Ear: External ear normal.   Eyes: Conjunctivae and EOM are normal. Pupils are equal, round, and reactive to light.   Neck: Neck supple.   Normal range of motion.  Cardiovascular:  Normal rate, regular rhythm, normal heart sounds and intact distal pulses.           Pulmonary/Chest: Breath sounds normal.   Abdominal: Abdomen is soft. Bowel sounds are normal.   Musculoskeletal:         General: Normal range of motion.      Cervical back: Normal range of motion and neck supple.     Neurological: She is alert and oriented to person, place, and time. GCS score is 15. GCS eye subscore is 4. GCS verbal subscore is 5. GCS motor subscore is 6.   Skin: Skin is warm and dry. Capillary refill takes less than 2 seconds.   Psychiatric: She has a normal mood and affect. Her behavior is normal. Judgment and thought content normal.         ED Course   Procedures  Labs Reviewed - No data to display       Imaging Results              X-Ray Lumbar Spine Ap And Lateral (Preliminary result)  Result time 01/23/25 22:26:11      Wet Read by Adolfo Arrieta MD (01/23/25 22:26:11, [facility] - Emergency Dept, Emergency Medicine)    No fracture or subluxation                                     Medications   fentaNYL injection 100 mcg (100 mcg Intramuscular Given 1/23/25 2209)     Medical Decision Making  Amount and/or Complexity of Data Reviewed  Radiology: ordered and independent interpretation performed. Decision-making details documented in ED Course.    Risk  Prescription drug management.  Parenteral  controlled substances.    Differential includes:  sciatica, herniated disc, spasm, SI joint dysfunction.  Will give IM analgesics and obtain L-spine x-rays.                                  Clinical Impression:  Final diagnoses:  [M54.32] Left sided sciatica (Primary)          ED Disposition Condition    Discharge Stable          ED Prescriptions       Medication Sig Dispense Start Date End Date Auth. Provider    gabapentin (NEURONTIN) 300 MG capsule Take 1 capsule (300 mg total) by mouth 3 (three) times daily. for 14 days 42 capsule 1/23/2025 2/6/2025 Adolfo Arrieta MD    ketorolac (TORADOL) 10 mg tablet Take 1 tablet (10 mg total) by mouth every 6 (six) hours. for 5 days 20 tablet 1/23/2025 1/28/2025 Adolfo Arrieta MD    cyclobenzaprine (FLEXERIL) 10 MG tablet Take 1 tablet (10 mg total) by mouth 3 (three) times daily as needed for Muscle spasms. 15 tablet 1/23/2025 1/28/2025 Adolfo Arrieta MD    HYDROcodone-acetaminophen (NORCO)  mg per tablet Take 1 tablet by mouth every 6 (six) hours as needed for Pain. Final diagnoses: [M54.32] Left sided sciatica (Primary) 20 tablet 1/23/2025 1/28/2025 Adolfo Arrieta MD          Follow-up Information       Follow up With Specialties Details Why Contact Info    Liz Glynn PA-C Internal Medicine Schedule an appointment as soon as possible for a visit   [address] [place] [id number]  [phone]               Adolfo Arrieta MD  01/23/25 9113

## 2025-02-23 ENCOUNTER — HOSPITAL ENCOUNTER (EMERGENCY)
Facility: HOSPITAL | Age: 50
Discharge: HOME | End: 2025-02-23
Attending: EMERGENCY MEDICINE

## 2025-02-23 VITALS
DIASTOLIC BLOOD PRESSURE: 81 MMHG | BODY MASS INDEX: 30.82 KG/M2 | WEIGHT: 185 LBS | SYSTOLIC BLOOD PRESSURE: 122 MMHG | RESPIRATION RATE: 18 BRPM | OXYGEN SATURATION: 99 % | HEART RATE: 98 BPM | TEMPERATURE: 98 F | HEIGHT: 65 IN

## 2025-02-23 DIAGNOSIS — M54.30 SCIATICA, UNSPECIFIED LATERALITY: Primary | ICD-10-CM

## 2025-02-23 LAB — B-HCG UR QL: NEGATIVE

## 2025-02-23 PROCEDURE — 25000003 PHARM REV CODE 250: Performed by: EMERGENCY MEDICINE

## 2025-02-23 PROCEDURE — 63600175 PHARM REV CODE 636 W HCPCS: Performed by: EMERGENCY MEDICINE

## 2025-02-23 PROCEDURE — 96372 THER/PROPH/DIAG INJ SC/IM: CPT | Performed by: EMERGENCY MEDICINE

## 2025-02-23 PROCEDURE — 81025 URINE PREGNANCY TEST: CPT | Performed by: EMERGENCY MEDICINE

## 2025-02-23 PROCEDURE — 99284 EMERGENCY DEPT VISIT MOD MDM: CPT | Mod: 25

## 2025-02-23 RX ORDER — HYDROCODONE BITARTRATE AND ACETAMINOPHEN 10; 325 MG/1; MG/1
1 TABLET ORAL EVERY 6 HOURS PRN
Qty: 12 TABLET | Refills: 0 | Status: SHIPPED | OUTPATIENT
Start: 2025-02-23 | End: 2025-02-23

## 2025-02-23 RX ORDER — DEXAMETHASONE 4 MG/1
8 TABLET ORAL
Status: COMPLETED | OUTPATIENT
Start: 2025-02-23 | End: 2025-02-23

## 2025-02-23 RX ORDER — GABAPENTIN 100 MG/1
100 CAPSULE ORAL 3 TIMES DAILY
Qty: 90 CAPSULE | Refills: 11 | Status: SHIPPED | OUTPATIENT
Start: 2025-02-23 | End: 2026-02-23

## 2025-02-23 RX ORDER — HYDROCODONE BITARTRATE AND ACETAMINOPHEN 10; 325 MG/1; MG/1
1 TABLET ORAL
Refills: 0 | Status: COMPLETED | OUTPATIENT
Start: 2025-02-23 | End: 2025-02-23

## 2025-02-23 RX ORDER — GABAPENTIN 100 MG/1
100 CAPSULE ORAL 3 TIMES DAILY
Qty: 90 CAPSULE | Refills: 11 | Status: SHIPPED | OUTPATIENT
Start: 2025-02-23 | End: 2025-02-23

## 2025-02-23 RX ORDER — HYDROCODONE BITARTRATE AND ACETAMINOPHEN 10; 325 MG/1; MG/1
1 TABLET ORAL EVERY 6 HOURS PRN
Qty: 12 TABLET | Refills: 0 | Status: SHIPPED | OUTPATIENT
Start: 2025-02-23

## 2025-02-23 RX ORDER — KETOROLAC TROMETHAMINE 30 MG/ML
60 INJECTION, SOLUTION INTRAMUSCULAR; INTRAVENOUS
Status: COMPLETED | OUTPATIENT
Start: 2025-02-23 | End: 2025-02-23

## 2025-02-23 RX ADMIN — HYDROCODONE BITARTRATE AND ACETAMINOPHEN 1 TABLET: 10; 325 TABLET ORAL at 08:02

## 2025-02-23 RX ADMIN — DEXAMETHASONE 8 MG: 4 TABLET ORAL at 08:02

## 2025-02-23 RX ADMIN — KETOROLAC TROMETHAMINE 60 MG: 30 INJECTION, SOLUTION INTRAMUSCULAR at 08:02

## 2025-02-23 NOTE — ED PROVIDER NOTES
Encounter Date: 2/23/2025       History     Chief Complaint   Patient presents with    Hip Pain     Pt c/o pain to left hip radiating down leg s/p fall 1/1/25.     49-year-old female presents with left buttock pain radiating down the back of her leg to her foot since around January 23rd.  Pain is worse with sitting and with laying.  Also worse with walking.  No lower extremity numbness, incontinence or retention.  Left foot feels heavy. Patient is currently taking ibuprofen.  She has tried gabapentin, ibuprofen, Flexeril, Norco.  Currently she is only taking ibuprofen.  Patient says Flexeril does nothing set make her sleep.  The gabapentin makes her feel weird.  She is out of her Norco    The history is provided by the patient.     Review of patient's allergies indicates:  No Known Allergies  Past Medical History:   Diagnosis Date    Fibroids      History reviewed. No pertinent surgical history.  No family history on file.  Social History[1]  Review of Systems   Constitutional:  Negative for chills, diaphoresis, fatigue and fever.   HENT:  Negative for congestion, drooling, ear discharge, ear pain, postnasal drip, rhinorrhea, sinus pressure, sinus pain, sore throat and tinnitus.    Eyes:  Negative for discharge.   Respiratory:  Negative for cough, chest tightness, shortness of breath and wheezing.    Cardiovascular:  Negative for chest pain and palpitations.   Gastrointestinal:  Negative for abdominal pain, diarrhea, nausea and vomiting.   Genitourinary:  Negative for frequency, hematuria and urgency.   Musculoskeletal:  Positive for back pain. Negative for neck pain and neck stiffness.   Skin:  Negative for rash.   Neurological:  Negative for syncope, weakness, light-headedness, numbness and headaches.   Psychiatric/Behavioral:  Negative for suicidal ideas.        Physical Exam     Initial Vitals [02/23/25 0748]   BP Pulse Resp Temp SpO2   (!) 174/102 110 20 97.9 °F (36.6 °C) 98 %      MAP       --         Physical  Exam    Nursing note and vitals reviewed.  Eyes: Conjunctivae are normal.   Cardiovascular:  Normal rate.           Pulmonary/Chest: No respiratory distress.   Abdominal: Abdomen is soft. There is no abdominal tenderness. There is no guarding.     Neurological: She is alert and oriented to person, place, and time. She has normal strength. No cranial nerve deficit or sensory deficit. Gait normal.   Skin: No rash noted. No erythema.         ED Course   Procedures  Labs Reviewed   PREGNANCY TEST, URINE RAPID - Normal       Result Value    hCG Qualitative, Urine Negative            Imaging Results              CT Lumbar Spine Without Contrast (Final result)  Result time 02/23/25 09:07:54      Final result by Zaid Martel MD (02/23/25 09:07:54)                   Narrative:    EXAMINATION  CT LUMBAR SPINE WITHOUT CONTRAST    CLINICAL HISTORY  Lumbar radiculopathy, symptoms persist with conservative treatment;    TECHNIQUE  Helical-acquisition CT images were obtained without the intravenous administration of iodine-based contrast media.  Multiplanar reconstructions were accomplished by a CT technologist at a separate workstation and pushed to PACS for physician review.    COMPARISON  None available at the time of initial interpretation.    FINDINGS  Images were reviewed in soft tissue and bone windows.    Exam quality: adequate for evaluation    Vertebral segments: No acutely displaced fracture is visualized. No compression deformity or focal vertebral body abnormality. No evidence of traumatic subluxation. Degenerative endplate and facet changes are present, most pronounced L4 through S1.  There is associated mild L5-S1 intervertebral disc height loss.  An approximately 9 mm x 7 mm hyperdensity is appreciated at the left L4-5 subarticular recess and could represent extruded disc fragment but is of otherwise indeterminate etiology.    Curvature: Straightening of the normal lordosis is evident. There is no abnormal  lateral curvature.    Bony pelvis: Sacroiliac joints are symmetric and congruent. The visualized pelvic structures are without acute displacement or suspicious focal abnormality.    Other findings: No prevertebral thickening, expansile collection, or focal contour irregularity. No acute muscular abnormality. No evidence of acute process or concerning focal abnormality within the visualized abdomen.    IMPRESSION  1. No convincing acute abnormality.  2. Lower lumbar levels spondylitic changes.  3. Left L4-5 subarticular recess hyperdensity may represent extruded disc fragment, likely results in impingement upon the descending L5 nerve root.  4. Straightening of the lumbar lordosis may be positional or secondary to muscle spasm.    RADIATION DOSE  Automated tube current modulation, weight-based exposure dosing, and/or iterative reconstruction technique utilized to reach lowest reasonably achievable exposure rate.    DLP: 654 mGy*cm      Electronically signed by: Zaid Martel  Date:    02/23/2025  Time:    09:07                                     Medications   ketorolac injection 60 mg (60 mg Intramuscular Given 2/23/25 0809)   HYDROcodone-acetaminophen  mg per tablet 1 tablet (1 tablet Oral Given 2/23/25 0808)   dexAMETHasone tablet 8 mg (8 mg Oral Given 2/23/25 0809)     Medical Decision Making  Medical Decision Making  Problem list/ differential diagnosis including but not limited to:  Ligament injury, muscle strain, disc herniation, radiculopathy, epidural compression (cauda equina/ conus medullaris/ abscess/ hematoma), fracture, stenosis, myelitis, osteomyelitis      Patient's chronic illnesses impacting care:  none    Diagnostic test considered but not ordered:    My interpretations:      Radiology reports      Discussion of case with external qualified healthcare professionals:  Not applicable    Review of external notes( inpt, ems, NH, clinic):      Decision rules/scores:    Medications reviewed:   Ibuprofen  Medications ordered in the ER: toradol  Discharge prescriptions:    Social variables possible impacting patient's healthcare:    Code status/discussion    Shared decision making:    Consideration for admission versus discharge: stable for discharge     Amount and/or Complexity of Data Reviewed  Labs:  Decision-making details documented in ED Course.  Radiology: ordered.    Risk  Prescription drug management.               ED Course as of 02/23/25 1342   Sun Feb 23, 2025   0826 hCG Qualitative, Urine: Negative [WC]   0922  Left L4-5 subarticular recess hyperdensity may represent extruded disc fragment, likely results in impingement upon the descending L5 nerve root [WC]      ED Course User Index  [WC] Kj Goss MD                           Clinical Impression:  Final diagnoses:  [M54.30] Sciatica, unspecified laterality (Primary)          ED Disposition Condition    Discharge Stable          ED Prescriptions       Medication Sig Dispense Start Date End Date Auth. Provider    HYDROcodone-acetaminophen (NORCO)  mg per tablet  (Status: Discontinued) Take 1 tablet by mouth every 6 (six) hours as needed for Pain. 12 tablet 2/23/2025 2/23/2025 Kj Goss MD    gabapentin (NEURONTIN) 100 MG capsule  (Status: Discontinued) Take 1 capsule (100 mg total) by mouth 3 (three) times daily. 90 capsule 2/23/2025 2/23/2025 Kj Goss MD    gabapentin (NEURONTIN) 100 MG capsule Take 1 capsule (100 mg total) by mouth 3 (three) times daily. 90 capsule 2/23/2025 2/23/2026 Kj Goss MD    HYDROcodone-acetaminophen (NORCO)  mg per tablet Take 1 tablet by mouth every 6 (six) hours as needed for Pain. 12 tablet 2/23/2025 -- Kj Goss MD          Follow-up Information    None              Kj Goss MD  02/23/25 0921       [1]   Social History  Tobacco Use    Smoking status: Every Day     Types: Cigarettes    Smokeless tobacco: Never   Substance Use Topics     Alcohol use: Not Currently    Drug use: Never        Kj Goss MD  02/23/25 7320

## 2025-02-23 NOTE — Clinical Note
"Sugar Weldon" Martinezchristaklaudia was seen and treated in our emergency department on 2/23/2025.  She may return to work on 02/25/2025.       If you have any questions or concerns, please don't hesitate to call.       RN    "

## 2025-04-27 ENCOUNTER — HOSPITAL ENCOUNTER (EMERGENCY)
Facility: HOSPITAL | Age: 50
Discharge: HOME | End: 2025-04-27
Attending: FAMILY MEDICINE

## 2025-04-27 VITALS
HEART RATE: 133 BPM | OXYGEN SATURATION: 97 % | TEMPERATURE: 97 F | WEIGHT: 182 LBS | SYSTOLIC BLOOD PRESSURE: 133 MMHG | HEIGHT: 65 IN | BODY MASS INDEX: 30.32 KG/M2 | DIASTOLIC BLOOD PRESSURE: 105 MMHG | RESPIRATION RATE: 20 BRPM

## 2025-04-27 DIAGNOSIS — M54.30 SCIATICA, UNSPECIFIED LATERALITY: ICD-10-CM

## 2025-04-27 DIAGNOSIS — S39.012A LUMBAR STRAIN, INITIAL ENCOUNTER: Primary | ICD-10-CM

## 2025-04-27 DIAGNOSIS — M54.42 ACUTE LEFT-SIDED LOW BACK PAIN WITH LEFT-SIDED SCIATICA: ICD-10-CM

## 2025-04-27 LAB
ALBUMIN SERPL-MCNC: 4.1 G/DL (ref 3.5–5)
ALBUMIN/GLOB SERPL: 0.9 RATIO (ref 1.1–2)
ALP SERPL-CCNC: 75 UNIT/L (ref 40–150)
ALT SERPL-CCNC: 16 UNIT/L (ref 0–55)
ANION GAP SERPL CALC-SCNC: 11 MEQ/L
AST SERPL-CCNC: 14 UNIT/L (ref 11–45)
BACTERIA #/AREA URNS AUTO: ABNORMAL /HPF
BASOPHILS # BLD AUTO: 0.05 X10(3)/MCL
BASOPHILS NFR BLD AUTO: 0.5 %
BILIRUB SERPL-MCNC: 0.4 MG/DL
BILIRUB UR QL STRIP.AUTO: NEGATIVE
BUN SERPL-MCNC: 11.4 MG/DL (ref 7–18.7)
CALCIUM SERPL-MCNC: 9.7 MG/DL (ref 8.4–10.2)
CHLORIDE SERPL-SCNC: 106 MMOL/L (ref 98–107)
CLARITY UR: ABNORMAL
CO2 SERPL-SCNC: 25 MMOL/L (ref 22–29)
COLOR UR AUTO: YELLOW
CREAT SERPL-MCNC: 0.84 MG/DL (ref 0.55–1.02)
CREAT/UREA NIT SERPL: 14
EOSINOPHIL # BLD AUTO: 0.17 X10(3)/MCL (ref 0–0.9)
EOSINOPHIL NFR BLD AUTO: 1.8 %
ERYTHROCYTE [DISTWIDTH] IN BLOOD BY AUTOMATED COUNT: 14.2 % (ref 11.5–17)
GFR SERPLBLD CREATININE-BSD FMLA CKD-EPI: >60 ML/MIN/1.73/M2
GLOBULIN SER-MCNC: 4.6 GM/DL (ref 2.4–3.5)
GLUCOSE SERPL-MCNC: 130 MG/DL (ref 74–100)
GLUCOSE UR QL STRIP: NEGATIVE
HCT VFR BLD AUTO: 46.3 % (ref 37–47)
HGB BLD-MCNC: 15.2 G/DL (ref 12–16)
HGB UR QL STRIP: ABNORMAL
IMM GRANULOCYTES # BLD AUTO: 0.04 X10(3)/MCL (ref 0–0.04)
IMM GRANULOCYTES NFR BLD AUTO: 0.4 %
KETONES UR QL STRIP: ABNORMAL
LEUKOCYTE ESTERASE UR QL STRIP: ABNORMAL
LYMPHOCYTES # BLD AUTO: 4.26 X10(3)/MCL (ref 0.6–4.6)
LYMPHOCYTES NFR BLD AUTO: 45.4 %
MAGNESIUM SERPL-MCNC: 1.9 MG/DL (ref 1.6–2.6)
MCH RBC QN AUTO: 26.2 PG (ref 27–31)
MCHC RBC AUTO-ENTMCNC: 32.8 G/DL (ref 33–36)
MCV RBC AUTO: 79.8 FL (ref 80–94)
MONOCYTES # BLD AUTO: 0.58 X10(3)/MCL (ref 0.1–1.3)
MONOCYTES NFR BLD AUTO: 6.2 %
MUCOUS THREADS URNS QL MICRO: ABNORMAL /LPF
NEUTROPHILS # BLD AUTO: 4.29 X10(3)/MCL (ref 2.1–9.2)
NEUTROPHILS NFR BLD AUTO: 45.7 %
NITRITE UR QL STRIP: NEGATIVE
NRBC BLD AUTO-RTO: 0 %
PH UR STRIP: 6 [PH]
PLATELET # BLD AUTO: 292 X10(3)/MCL (ref 130–400)
PMV BLD AUTO: 11.8 FL (ref 7.4–10.4)
POTASSIUM SERPL-SCNC: 4.5 MMOL/L (ref 3.5–5.1)
PROT SERPL-MCNC: 8.7 GM/DL (ref 6.4–8.3)
PROT UR QL STRIP: NEGATIVE
RBC # BLD AUTO: 5.8 X10(6)/MCL (ref 4.2–5.4)
RBC #/AREA URNS AUTO: ABNORMAL /HPF
SODIUM SERPL-SCNC: 142 MMOL/L (ref 136–145)
SP GR UR STRIP.AUTO: 1.02 (ref 1–1.03)
SQUAMOUS #/AREA URNS AUTO: ABNORMAL /HPF
UROBILINOGEN UR STRIP-ACNC: 0.2
WBC # BLD AUTO: 9.39 X10(3)/MCL (ref 4.5–11.5)
WBC #/AREA URNS AUTO: ABNORMAL /HPF

## 2025-04-27 PROCEDURE — 83735 ASSAY OF MAGNESIUM: CPT | Performed by: FAMILY MEDICINE

## 2025-04-27 PROCEDURE — 80053 COMPREHEN METABOLIC PANEL: CPT | Performed by: FAMILY MEDICINE

## 2025-04-27 PROCEDURE — 81003 URINALYSIS AUTO W/O SCOPE: CPT | Performed by: FAMILY MEDICINE

## 2025-04-27 PROCEDURE — 96374 THER/PROPH/DIAG INJ IV PUSH: CPT

## 2025-04-27 PROCEDURE — 63600175 PHARM REV CODE 636 W HCPCS: Performed by: FAMILY MEDICINE

## 2025-04-27 PROCEDURE — 99285 EMERGENCY DEPT VISIT HI MDM: CPT | Mod: 25

## 2025-04-27 PROCEDURE — 96375 TX/PRO/DX INJ NEW DRUG ADDON: CPT

## 2025-04-27 PROCEDURE — 85025 COMPLETE CBC W/AUTO DIFF WBC: CPT | Performed by: FAMILY MEDICINE

## 2025-04-27 RX ORDER — METHOCARBAMOL 100 MG/ML
1000 INJECTION, SOLUTION INTRAMUSCULAR; INTRAVENOUS
Status: COMPLETED | OUTPATIENT
Start: 2025-04-27 | End: 2025-04-27

## 2025-04-27 RX ORDER — HYDROCODONE BITARTRATE AND ACETAMINOPHEN 10; 325 MG/1; MG/1
1 TABLET ORAL EVERY 6 HOURS PRN
Qty: 20 TABLET | Refills: 0 | Status: SHIPPED | OUTPATIENT
Start: 2025-04-27 | End: 2025-05-02

## 2025-04-27 RX ORDER — HYDROMORPHONE HYDROCHLORIDE 2 MG/ML
1 INJECTION, SOLUTION INTRAMUSCULAR; INTRAVENOUS; SUBCUTANEOUS
Refills: 0 | Status: COMPLETED | OUTPATIENT
Start: 2025-04-27 | End: 2025-04-27

## 2025-04-27 RX ORDER — ONDANSETRON HYDROCHLORIDE 2 MG/ML
4 INJECTION, SOLUTION INTRAVENOUS
Status: COMPLETED | OUTPATIENT
Start: 2025-04-27 | End: 2025-04-27

## 2025-04-27 RX ORDER — METHYLPREDNISOLONE 4 MG/1
TABLET ORAL
Qty: 1 EACH | Refills: 0 | Status: SHIPPED | OUTPATIENT
Start: 2025-04-27

## 2025-04-27 RX ORDER — BACLOFEN 10 MG/1
10 TABLET ORAL 3 TIMES DAILY PRN
Qty: 30 TABLET | Refills: 0 | Status: SHIPPED | OUTPATIENT
Start: 2025-04-27 | End: 2025-05-07

## 2025-04-27 RX ORDER — DEXAMETHASONE SODIUM PHOSPHATE 4 MG/ML
8 INJECTION, SOLUTION INTRA-ARTICULAR; INTRALESIONAL; INTRAMUSCULAR; INTRAVENOUS; SOFT TISSUE
Status: COMPLETED | OUTPATIENT
Start: 2025-04-27 | End: 2025-04-27

## 2025-04-27 RX ADMIN — DEXAMETHASONE SODIUM PHOSPHATE 8 MG: 4 INJECTION, SOLUTION INTRA-ARTICULAR; INTRALESIONAL; INTRAMUSCULAR; INTRAVENOUS; SOFT TISSUE at 02:04

## 2025-04-27 RX ADMIN — ONDANSETRON 4 MG: 2 INJECTION INTRAMUSCULAR; INTRAVENOUS at 02:04

## 2025-04-27 RX ADMIN — METHOCARBAMOL 1000 MG: 100 INJECTION INTRAMUSCULAR; INTRAVENOUS at 02:04

## 2025-04-27 RX ADMIN — HYDROMORPHONE HYDROCHLORIDE 1 MG: 2 INJECTION, SOLUTION INTRAMUSCULAR; INTRAVENOUS; SUBCUTANEOUS at 02:04

## 2025-04-27 NOTE — ED PROVIDER NOTES
Encounter Date: 4/27/2025       History     Chief Complaint   Patient presents with    Back Pain     Pt states she has a ruptured disc.  Waiting for an appointment with neurosurgeon       49-year-old female with a history of scoliosis and reports of a ruptured lumbar disc presents emergency room with complaints of worsening pain with radiation down her left lower leg.  Patient reports home medications not working.  When symptoms worsened, she came to the emergency room for evaluation.  Denies bladder or bowel incontinence.  Denies dysuria or hematuria.  Denies weakness of lower extremities.    The history is provided by the patient.     Review of patient's allergies indicates:  No Known Allergies  Past Medical History:   Diagnosis Date    Fibroids      History reviewed. No pertinent surgical history.  No family history on file.  Social History[1]  Review of Systems   Constitutional:  Negative for chills, fatigue and fever.   HENT:  Negative for ear pain, rhinorrhea and sore throat.    Eyes:  Negative for photophobia and pain.   Respiratory:  Negative for cough, shortness of breath and wheezing.    Cardiovascular:  Negative for chest pain.   Gastrointestinal:  Negative for abdominal pain, diarrhea, nausea and vomiting.   Genitourinary:  Negative for dysuria.   Musculoskeletal:  Positive for back pain.   Neurological:  Negative for dizziness, weakness and headaches.   All other systems reviewed and are negative.      Physical Exam     Initial Vitals [04/27/25 1357]   BP Pulse Resp Temp SpO2   (!) 133/105 (!) 133 20 97.3 °F (36.3 °C) 97 %      MAP       --         Physical Exam    Nursing note and vitals reviewed.  Constitutional: She appears well-developed and well-nourished.   Patient currently pacing around room, appears uncomfortable, nontoxic appearing   HENT:   Head: Normocephalic and atraumatic.   Eyes: Conjunctivae and EOM are normal. Pupils are equal, round, and reactive to light.   Neck: Neck supple.   Normal  range of motion.  Cardiovascular:  Normal heart sounds and intact distal pulses.     Exam reveals no gallop and no friction rub.       No murmur heard.  Tachycardia, regular rhythm   Pulmonary/Chest: No respiratory distress. She has no wheezes. She has no rhonchi. She has no rales.   Abdominal: Abdomen is soft. Bowel sounds are normal. She exhibits no distension. There is no abdominal tenderness. There is no rebound and no guarding.   Musculoskeletal:         General: Normal range of motion.      Cervical back: Normal range of motion and neck supple.     Neurological: She is alert and oriented to person, place, and time.   Skin: Skin is warm and dry. Capillary refill takes less than 2 seconds. No erythema.   Psychiatric: She has a normal mood and affect. Her behavior is normal. Judgment and thought content normal.         ED Course   Procedures  Labs Reviewed   COMPREHENSIVE METABOLIC PANEL - Abnormal       Result Value    Sodium 142      Potassium 4.5      Chloride 106      CO2 25      Glucose 130 (*)     Blood Urea Nitrogen 11.4      Creatinine 0.84      Calcium 9.7      Protein Total 8.7 (*)     Albumin 4.1      Globulin 4.6 (*)     Albumin/Globulin Ratio 0.9 (*)     Bilirubin Total 0.4      ALP 75      ALT 16      AST 14      eGFR >60      Anion Gap 11.0      BUN/Creatinine Ratio 14     URINALYSIS, REFLEX TO URINE CULTURE - Abnormal    Color, UA Yellow      Appearance, UA Slightly Cloudy (*)     Specific Gravity, UA 1.025      pH, UA 6.0      Protein, UA Negative      Glucose, UA Negative      Ketones, UA Trace (*)     Blood, UA Moderate (*)     Bilirubin, UA Negative      Urobilinogen, UA 0.2      Nitrites, UA Negative      Leukocyte Esterase, UA Trace (*)    CBC WITH DIFFERENTIAL - Abnormal    WBC 9.39      RBC 5.80 (*)     Hgb 15.2      Hct 46.3      MCV 79.8 (*)     MCH 26.2 (*)     MCHC 32.8 (*)     RDW 14.2      Platelet 292      MPV 11.8 (*)     Neut % 45.7      Lymph % 45.4      Mono % 6.2      Eos % 1.8       Basophil % 0.5      Imm Grans % 0.4      Neut # 4.29      Lymph # 4.26      Mono # 0.58      Eos # 0.17      Baso # 0.05      Imm Gran # 0.04      NRBC% 0.0     URINALYSIS, MICROSCOPIC - Abnormal    Bacteria, UA Occasional      Mucous, UA Trace (*)     RBC, UA 6-10 (*)     WBC, UA 3-5      Squamous Epithelial Cells, UA Many (*)    MAGNESIUM - Normal    Magnesium Level 1.90     CBC W/ AUTO DIFFERENTIAL    Narrative:     The following orders were created for panel order CBC Auto Differential.  Procedure                               Abnormality         Status                     ---------                               -----------         ------                     CBC with Differential[[id number]]       Abnormal            Final result                 Please view results for these tests on the individual orders.          Imaging Results              CT Lumbar Spine Without Contrast (Final result)  Result time 04/27/25 15:04:18      Final result by Jose Armando De Paz MD (04/27/25 15:04:18)                   Impression:      Multilevel degenerative changes seen as outlined above overall similar to the 02/23/2025 examination      Electronically signed by: Bebeto DeP az  Date:    04/27/2025  Time:    15:04               Narrative:    EXAMINATION:  CT LUMBAR SPINE WITHOUT CONTRAST    CLINICAL HISTORY:  Lower back pain with radiculopathy to left lower leg;    TECHNIQUE:  Low-dose axial images with sagittal and coronal reformations are obtained through the lumbar spine.  Contrast was not administered.  Automatic exposure control (AEC) is utilized to reduce patient radiation exposure.    COMPARISON:  02/23/2025    FINDINGS:  The vertebral body heights are well maintained.  Alignment is well maintained.  No fracture is seen.  No dislocation is seen.    At the level of L1-L2 there is mild facet arthropathy seen bilaterally.  No significant spinal or foraminal stenosis seen.    At L2-L3 mild facet arthropathy is seen  bilaterally.  No significant spinal or foraminal stenosis is seen.    At L3-L4 mild facet arthropathy is seen bilaterally.  There is a mild broad-based disc bulge seen and there appears to be some left foraminal stenosis.    At L4-5 moderate to severe facet arthropathy is seen bilaterally.  There is a broad-based disc osteophyte complex seen.  There is bilateral foraminal stenosis.    At L5-S1 mild facet arthropathy is seen bilaterally.  Another broad-based disc osteophyte complex seen.  There is bilateral foraminal stenosis.                                       Medications   sodium chloride 0.9% bolus 1,000 mL 1,000 mL (has no administration in time range)   HYDROmorphone (PF) injection 1 mg (1 mg Intravenous Given 4/27/25 1433)   ondansetron injection 4 mg (4 mg Intravenous Given 4/27/25 1433)   methocarbamoL injection 1,000 mg (1,000 mg Intravenous Given 4/27/25 1434)   dexAMETHasone injection 8 mg (8 mg Intravenous Given 4/27/25 1433)     Medical Decision Making  49-year-old female presents emergency room with complaints of lower back pain with radiation of the left lower extremity.  Currently 5/5 strength of bilateral lower extremities.  Will obtain laboratory evaluation including CBC CMP and urinalysis, will provide symptomatic medication and continue to monitor     Differential diagnosis:  Herniated disc, lumbar strain, electrolyte abnormality, acute kidney injury, dehydration, ureterolithiasis    Amount and/or Complexity of Data Reviewed  External Data Reviewed: notes.     Details: Reviewed ED visit from 02/28/2025 where patient was seen at our [facility] with similar pain.  Labs: ordered.  Radiology: ordered.    Risk  Prescription drug management.               ED Course as of 04/27/25 1534   Sun Apr 27, 2025   1503 Occasional bacteria with many squamous epithelial cells on UA likely consistent with a contaminant; nitrate negative.  [MW]   1518 Patient feeling improved.  Discussed results with the  patient.  Stable for discharge to home.  ER precautions given for any acute worsening. [MW]      ED Course User Index  [MW] Guevara Patton MD                           Clinical Impression:  Final diagnoses:  [S39.012A] Lumbar strain, initial encounter (Primary)  [M54.42] Acute left-sided low back pain with left-sided sciatica          ED Disposition Condition    Discharge Good          ED Prescriptions       Medication Sig Dispense Start Date End Date Auth. Provider    HYDROcodone-acetaminophen (NORCO)  mg per tablet Take 1 tablet by mouth every 6 (six) hours as needed for Pain. 20 tablet 4/27/2025 5/2/2025 Guevara Patton MD    methylPREDNISolone (MEDROL DOSEPACK) 4 mg tablet Take as directed on keri 1 each 4/27/2025 -- Guevara Patton MD    baclofen (LIORESAL) 10 MG tablet Take 1 tablet (10 mg total) by mouth 3 (three) times daily as needed (body aches). 30 tablet 4/27/2025 5/7/2025 Guevara Patton MD          Follow-up Information       Follow up With Specialties Details Why Contact Info    Liz Glynn PA-C Internal Medicine   [address] [place] [id number] [phone]      [facility] - Emergency Dept Emergency Medicine  As needed, If symptoms worsen [address]  [facility] [address] [phone]                 [1]   Social History  Tobacco Use    Smoking status: Every Day     Types: Cigarettes    Smokeless tobacco: Never   Substance Use Topics    Alcohol use: Not Currently    Drug use: Never        Guevara Patton MD  04/27/25 3304